# Patient Record
Sex: MALE | Race: ASIAN | NOT HISPANIC OR LATINO
[De-identification: names, ages, dates, MRNs, and addresses within clinical notes are randomized per-mention and may not be internally consistent; named-entity substitution may affect disease eponyms.]

---

## 2023-01-03 ENCOUNTER — NON-APPOINTMENT (OUTPATIENT)
Age: 63
End: 2023-01-03

## 2023-01-03 ENCOUNTER — TRANSCRIPTION ENCOUNTER (OUTPATIENT)
Age: 63
End: 2023-01-03

## 2023-01-03 ENCOUNTER — APPOINTMENT (OUTPATIENT)
Dept: OPHTHALMOLOGY | Facility: CLINIC | Age: 63
End: 2023-01-03
Payer: MEDICAID

## 2023-01-03 PROCEDURE — 92004 COMPRE OPH EXAM NEW PT 1/>: CPT

## 2023-01-03 PROCEDURE — 92015 DETERMINE REFRACTIVE STATE: CPT | Mod: NC

## 2023-01-31 ENCOUNTER — EMERGENCY (EMERGENCY)
Facility: HOSPITAL | Age: 63
LOS: 1 days | Discharge: HOME | End: 2023-01-31
Attending: EMERGENCY MEDICINE | Admitting: EMERGENCY MEDICINE
Payer: MEDICAID

## 2023-01-31 VITALS
DIASTOLIC BLOOD PRESSURE: 89 MMHG | OXYGEN SATURATION: 96 % | SYSTOLIC BLOOD PRESSURE: 182 MMHG | RESPIRATION RATE: 16 BRPM | TEMPERATURE: 98 F | HEART RATE: 105 BPM

## 2023-01-31 VITALS
SYSTOLIC BLOOD PRESSURE: 137 MMHG | TEMPERATURE: 98 F | RESPIRATION RATE: 18 BRPM | HEART RATE: 74 BPM | OXYGEN SATURATION: 99 % | DIASTOLIC BLOOD PRESSURE: 72 MMHG

## 2023-01-31 LAB
ALBUMIN SERPL ELPH-MCNC: 4.5 G/DL — SIGNIFICANT CHANGE UP (ref 3.3–5)
ALP SERPL-CCNC: 68 U/L — SIGNIFICANT CHANGE UP (ref 40–120)
ALT FLD-CCNC: 30 U/L — SIGNIFICANT CHANGE UP (ref 4–41)
ANION GAP SERPL CALC-SCNC: 16 MMOL/L — HIGH (ref 7–14)
APPEARANCE UR: CLEAR — SIGNIFICANT CHANGE UP
AST SERPL-CCNC: 21 U/L — SIGNIFICANT CHANGE UP (ref 4–40)
BACTERIA # UR AUTO: NEGATIVE — SIGNIFICANT CHANGE UP
BASE EXCESS BLDV CALC-SCNC: -0.8 MMOL/L — SIGNIFICANT CHANGE UP (ref -2–3)
BASE EXCESS BLDV CALC-SCNC: -1.1 MMOL/L — SIGNIFICANT CHANGE UP (ref -2–3)
BASOPHILS # BLD AUTO: 0.04 K/UL — SIGNIFICANT CHANGE UP (ref 0–0.2)
BASOPHILS NFR BLD AUTO: 0.4 % — SIGNIFICANT CHANGE UP (ref 0–2)
BILIRUB SERPL-MCNC: 0.2 MG/DL — SIGNIFICANT CHANGE UP (ref 0.2–1.2)
BILIRUB UR-MCNC: NEGATIVE — SIGNIFICANT CHANGE UP
BLOOD GAS VENOUS COMPREHENSIVE RESULT: SIGNIFICANT CHANGE UP
BLOOD GAS VENOUS COMPREHENSIVE RESULT: SIGNIFICANT CHANGE UP
BUN SERPL-MCNC: 19 MG/DL — SIGNIFICANT CHANGE UP (ref 7–23)
CALCIUM SERPL-MCNC: 9.8 MG/DL — SIGNIFICANT CHANGE UP (ref 8.4–10.5)
CHLORIDE BLDV-SCNC: 101 MMOL/L — SIGNIFICANT CHANGE UP (ref 96–108)
CHLORIDE BLDV-SCNC: 104 MMOL/L — SIGNIFICANT CHANGE UP (ref 96–108)
CHLORIDE SERPL-SCNC: 100 MMOL/L — SIGNIFICANT CHANGE UP (ref 98–107)
CK SERPL-CCNC: 161 U/L — SIGNIFICANT CHANGE UP (ref 30–200)
CO2 BLDV-SCNC: 24.6 MMOL/L — SIGNIFICANT CHANGE UP (ref 22–26)
CO2 BLDV-SCNC: 26.2 MMOL/L — HIGH (ref 22–26)
CO2 SERPL-SCNC: 21 MMOL/L — LOW (ref 22–31)
COLOR SPEC: COLORLESS — SIGNIFICANT CHANGE UP
CREAT SERPL-MCNC: 1.56 MG/DL — HIGH (ref 0.5–1.3)
DIFF PNL FLD: NEGATIVE — SIGNIFICANT CHANGE UP
EGFR: 49 ML/MIN/1.73M2 — LOW
EOSINOPHIL # BLD AUTO: 0.17 K/UL — SIGNIFICANT CHANGE UP (ref 0–0.5)
EOSINOPHIL NFR BLD AUTO: 1.9 % — SIGNIFICANT CHANGE UP (ref 0–6)
FLUAV AG NPH QL: SIGNIFICANT CHANGE UP
FLUBV AG NPH QL: SIGNIFICANT CHANGE UP
GAS PNL BLDV: 134 MMOL/L — LOW (ref 136–145)
GAS PNL BLDV: 135 MMOL/L — LOW (ref 136–145)
GAS PNL BLDV: SIGNIFICANT CHANGE UP
GLUCOSE BLDV-MCNC: 129 MG/DL — HIGH (ref 70–99)
GLUCOSE BLDV-MCNC: 163 MG/DL — HIGH (ref 70–99)
GLUCOSE SERPL-MCNC: 177 MG/DL — HIGH (ref 70–99)
GLUCOSE UR QL: ABNORMAL
HCO3 BLDV-SCNC: 24 MMOL/L — SIGNIFICANT CHANGE UP (ref 22–29)
HCO3 BLDV-SCNC: 25 MMOL/L — SIGNIFICANT CHANGE UP (ref 22–29)
HCT VFR BLD CALC: 43.1 % — SIGNIFICANT CHANGE UP (ref 39–50)
HCT VFR BLDA CALC: 42 % — SIGNIFICANT CHANGE UP (ref 39–51)
HCT VFR BLDA CALC: 42 % — SIGNIFICANT CHANGE UP (ref 39–51)
HGB BLD CALC-MCNC: 14 G/DL — SIGNIFICANT CHANGE UP (ref 13–17)
HGB BLD CALC-MCNC: 14.1 G/DL — SIGNIFICANT CHANGE UP (ref 13–17)
HGB BLD-MCNC: 14.1 G/DL — SIGNIFICANT CHANGE UP (ref 13–17)
IANC: 5.25 K/UL — SIGNIFICANT CHANGE UP (ref 1.8–7.4)
IMM GRANULOCYTES NFR BLD AUTO: 0.2 % — SIGNIFICANT CHANGE UP (ref 0–0.9)
KETONES UR-MCNC: NEGATIVE — SIGNIFICANT CHANGE UP
LACTATE BLDV-MCNC: 2 MMOL/L — SIGNIFICANT CHANGE UP (ref 0.5–2)
LACTATE BLDV-MCNC: 2.3 MMOL/L — HIGH (ref 0.5–2)
LEUKOCYTE ESTERASE UR-ACNC: NEGATIVE — SIGNIFICANT CHANGE UP
LYMPHOCYTES # BLD AUTO: 2.37 K/UL — SIGNIFICANT CHANGE UP (ref 1–3.3)
LYMPHOCYTES # BLD AUTO: 26.4 % — SIGNIFICANT CHANGE UP (ref 13–44)
MCHC RBC-ENTMCNC: 29.6 PG — SIGNIFICANT CHANGE UP (ref 27–34)
MCHC RBC-ENTMCNC: 32.7 GM/DL — SIGNIFICANT CHANGE UP (ref 32–36)
MCV RBC AUTO: 90.4 FL — SIGNIFICANT CHANGE UP (ref 80–100)
MONOCYTES # BLD AUTO: 1.14 K/UL — HIGH (ref 0–0.9)
MONOCYTES NFR BLD AUTO: 12.7 % — SIGNIFICANT CHANGE UP (ref 2–14)
NEUTROPHILS # BLD AUTO: 5.25 K/UL — SIGNIFICANT CHANGE UP (ref 1.8–7.4)
NEUTROPHILS NFR BLD AUTO: 58.4 % — SIGNIFICANT CHANGE UP (ref 43–77)
NITRITE UR-MCNC: NEGATIVE — SIGNIFICANT CHANGE UP
NRBC # BLD: 0 /100 WBCS — SIGNIFICANT CHANGE UP (ref 0–0)
NRBC # FLD: 0 K/UL — SIGNIFICANT CHANGE UP (ref 0–0)
PCO2 BLDV: 37 MMHG — LOW (ref 42–55)
PCO2 BLDV: 45 MMHG — SIGNIFICANT CHANGE UP (ref 42–55)
PH BLDV: 7.35 — SIGNIFICANT CHANGE UP (ref 7.32–7.43)
PH BLDV: 7.41 — SIGNIFICANT CHANGE UP (ref 7.32–7.43)
PH UR: 7.5 — SIGNIFICANT CHANGE UP (ref 5–8)
PLATELET # BLD AUTO: 275 K/UL — SIGNIFICANT CHANGE UP (ref 150–400)
PO2 BLDV: 45 MMHG — SIGNIFICANT CHANGE UP
PO2 BLDV: 61 MMHG — SIGNIFICANT CHANGE UP
POTASSIUM BLDV-SCNC: 3.2 MMOL/L — LOW (ref 3.5–5.1)
POTASSIUM BLDV-SCNC: 4.2 MMOL/L — SIGNIFICANT CHANGE UP (ref 3.5–5.1)
POTASSIUM SERPL-MCNC: 3.4 MMOL/L — LOW (ref 3.5–5.3)
POTASSIUM SERPL-SCNC: 3.4 MMOL/L — LOW (ref 3.5–5.3)
PROT SERPL-MCNC: 7.8 G/DL — SIGNIFICANT CHANGE UP (ref 6–8.3)
PROT UR-MCNC: ABNORMAL
RBC # BLD: 4.77 M/UL — SIGNIFICANT CHANGE UP (ref 4.2–5.8)
RBC # FLD: 13 % — SIGNIFICANT CHANGE UP (ref 10.3–14.5)
RBC CASTS # UR COMP ASSIST: SIGNIFICANT CHANGE UP /HPF (ref 0–4)
RSV RNA NPH QL NAA+NON-PROBE: SIGNIFICANT CHANGE UP
SAO2 % BLDV: 72.5 % — SIGNIFICANT CHANGE UP
SAO2 % BLDV: 87.1 % — SIGNIFICANT CHANGE UP
SARS-COV-2 RNA SPEC QL NAA+PROBE: SIGNIFICANT CHANGE UP
SODIUM SERPL-SCNC: 137 MMOL/L — SIGNIFICANT CHANGE UP (ref 135–145)
SP GR SPEC: 1.01 — SIGNIFICANT CHANGE UP (ref 1.01–1.05)
UROBILINOGEN FLD QL: SIGNIFICANT CHANGE UP
WBC # BLD: 8.99 K/UL — SIGNIFICANT CHANGE UP (ref 3.8–10.5)
WBC # FLD AUTO: 8.99 K/UL — SIGNIFICANT CHANGE UP (ref 3.8–10.5)
WBC UR QL: SIGNIFICANT CHANGE UP /HPF (ref 0–5)

## 2023-01-31 PROCEDURE — 70498 CT ANGIOGRAPHY NECK: CPT | Mod: 26,MA

## 2023-01-31 PROCEDURE — 70496 CT ANGIOGRAPHY HEAD: CPT | Mod: 26,MA

## 2023-01-31 PROCEDURE — 74177 CT ABD & PELVIS W/CONTRAST: CPT | Mod: 26,MA

## 2023-01-31 PROCEDURE — 99284 EMERGENCY DEPT VISIT MOD MDM: CPT

## 2023-01-31 PROCEDURE — 71046 X-RAY EXAM CHEST 2 VIEWS: CPT | Mod: 26

## 2023-01-31 RX ORDER — SODIUM CHLORIDE 9 MG/ML
1000 INJECTION INTRAMUSCULAR; INTRAVENOUS; SUBCUTANEOUS ONCE
Refills: 0 | Status: COMPLETED | OUTPATIENT
Start: 2023-01-31 | End: 2023-01-31

## 2023-01-31 RX ORDER — ACETAMINOPHEN 500 MG
650 TABLET ORAL ONCE
Refills: 0 | Status: COMPLETED | OUTPATIENT
Start: 2023-01-31 | End: 2023-01-31

## 2023-01-31 RX ORDER — ONDANSETRON 8 MG/1
4 TABLET, FILM COATED ORAL ONCE
Refills: 0 | Status: COMPLETED | OUTPATIENT
Start: 2023-01-31 | End: 2023-01-31

## 2023-01-31 RX ADMIN — SODIUM CHLORIDE 1000 MILLILITER(S): 9 INJECTION INTRAMUSCULAR; INTRAVENOUS; SUBCUTANEOUS at 01:53

## 2023-01-31 RX ADMIN — ONDANSETRON 4 MILLIGRAM(S): 8 TABLET, FILM COATED ORAL at 01:53

## 2023-01-31 RX ADMIN — Medication 650 MILLIGRAM(S): at 01:53

## 2023-01-31 NOTE — ED ADULT NURSE REASSESSMENT NOTE - NS ED NURSE REASSESS COMMENT FT1
pt A&Ox4 offering no complaints at this time. respirations even and unlabored. no acute distress noted. resting comfortably in bed at this time. awaiting CT results. safety maintained, side rails up

## 2023-01-31 NOTE — ED ADULT NURSE NOTE - OBJECTIVE STATEMENT
break coverage RN - Pt received in room 9 A&OX3 c/o body aches, pain in both legs, and increased thirst. PMHx HTN, DM, and hypercholesteremia. Pt denies fevers/chills, abd pain, CP, SOB. Resp even and unlabored. 20G IV placed to R AC. Labs drawn and sent. Will continue to monitor.

## 2023-01-31 NOTE — ED PROVIDER NOTE - NSTIMEPROVIDERCAREINITIATE_GEN_ER
1. \"Have you been to the ER, urgent care clinic since your last visit? Hospitalized since your last visit? \" No    2. \"Have you seen or consulted any other health care providers outside of the [address] [place] [address] since your last visit? \"  No    3. For patients aged 39-70: Has the patient had a colonoscopy / FIT/ Cologuard? Yes, Colonoscopy 2019 due for next 2029      If the patient is female:    4. For patients aged 41-77: Has the patient had a mammogram within the past 2 years? Yes, next 2023      5. For patients aged 21-65: Has the patient had a pap smear?   No 31-Jan-2023 01:06

## 2023-01-31 NOTE — ED PROVIDER NOTE - NS ED ROS FT
Constitutional: No fever, +chills.  Eyes:  No visual changes  ENMT:  No neck pain  Cardiac:  No chest pain  Respiratory:  No cough, SOB  GI:  +nausea, no vomiting, diarrhea, abdominal pain.  :  No dysuria, hematuria  MS:  No back pain.  Neuro:  +headache +paresthesia +body aches   Skin:  No skin rash  Except as documented in the HPI,  all other systems are negative.

## 2023-01-31 NOTE — ED PROVIDER NOTE - CARE PLAN
1 Principal Discharge DX:	Paresthesia  Secondary Diagnosis:	Body aches  Secondary Diagnosis:	Abdominal pain

## 2023-01-31 NOTE — ED ADULT NURSE NOTE - DISCHARGE DATE/TIME
Nutrition consult regarding education on diabetic diet (A1C 9.1).  Pt currently NPO, not appropriate for diabetic diet education.  RD to follow-up upon diet advancement.     Thanks! MANJINDER Jacques [phone]   31-Jan-2023 10:31

## 2023-01-31 NOTE — ED PROVIDER NOTE - ATTENDING CONTRIBUTION TO CARE
HPI: 62-year-old male with history of hypertension, diabetes, high cholesterol without any surgical history that presents with sudden onset body aches, for extremity paresthesias and generalized weakness as well as nausea without vomiting or diarrhea starting 3 hours prior to arrival.  Patient is with son-in-law at bedside.  States that he was in usual state of health and ate dinner.  Approximately 10 PM which was 3 hours prior to arrival started having symptoms as above.  8 normal dinner with everyone else without anybody else having issues.  Denies any fevers, chills, chest pain, shortness of breath, cough, runny nose, rash, travel, trauma, falls.  Patient is a lifetime non-smoker.  States that he feels like he needs to urinate at this time.  Otherwise no other complaints.    EXAM: is significant for ill-appearing male but not in acute distress.  Does not appear septic.  Oropharynx is clear but dry.  Heart is regular rate and rhythm.  Abdomen is tender to palpation in right lower quadrant with positive guarding.  No rebound.  No signs of trauma.  Abdomen is distended.  Suprapubic tenderness and fullness noted as well.  Bilateral lower extremities and upper extremities with normal pulses warm and well-perfused cap refills less than 2 seconds sensations intact to light touch.  Strength is 5 out of 5 in all 4 extremities.    MDM: 62-year-old male with history hypertension, diabetes, high cholesterol without any surgical history that presents with sudden onset body aches and paresthesias and generalized weakness.  Concern is for infection which is most likely versus appendicitis given findings of tenderness on right lower quadrant.  Considered dissection versus AAA however patient is non-smoker although he has significant hypertension on 5-6 different antihypertensives.  Patient has no abnormal neuro findings at this time.  He has normal pulses in all 4 extremities that are equal on exam.  Also consider bowel obstruction however patient has never had any surgical history and unlikely has a cancer as patient has not had any significant weight loss or night sweats.  At this time will obtain labs including urinalysis and culture, provide fluids and pain medications but also obtain CT imaging of his abdomen.  Will reassess

## 2023-01-31 NOTE — ED PROVIDER NOTE - CLINICAL SUMMARY MEDICAL DECISION MAKING FREE TEXT BOX
62-year-old male with history of hypertension, diabetes, high cholesterol without any surgical history that presents p/w body aches, paresthesias, abd pain x earlier today.  Vital stable on exam right lower quadrant tenderness to palpation,  nonperitoneal neck.  No focal neurodeficits.  moving all extremities. 62-year-old male with history of hypertension, diabetes, high cholesterol without any surgical history that presents p/w body aches, paresthesias, abd pain x earlier today. Vital stable on exam right lower quadrant tenderness to palpation,  non peritoneal neck.  No focal neurodeficits.  moving all extremities.  eval for viral syndrome, metabolic derangements, rhabdo  abd exam eval for appy, renal colic. pending labs and imaging.

## 2023-01-31 NOTE — ED PROVIDER NOTE - PATIENT PORTAL LINK FT
You can access the FollowMyHealth Patient Portal offered by [facility] by registering at the following website: [email or web address]. By joining [facility]’s FollowMyHealth portal, you will also be able to view your health information using other applications (apps) compatible with our system.

## 2023-01-31 NOTE — ED ADULT NURSE NOTE - CHIEF COMPLAINT QUOTE
Pt. is brought to [facility] ED by [facility] (unit 6M63) for body aches, pain in both legs, and being thirsty. PMH: HTN, DM, and hypercholestermia. FS at scene 199. No extremity weakness, equal arm strength, no facial droop noted at triage. . EKG at triage. NAD noted.

## 2023-01-31 NOTE — ED PROVIDER NOTE - PHYSICAL EXAMINATION
Vital signs reviewed  GENERAL: non toxic however ill, feels lethargic  HEAD: NCAT  EYES: Anicteric, perrla eomi  ENT: MMM  NECK: Supple, non tender  RESPIRATORY: Normal respiratory effort. CTA B/L. No wheezing, rales, rhonchi  CARDIOVASCULAR: Regular rate and rhythm  ABDOMEN: Soft. Nondistended. RLQ ttp, no gaurding or rebound.  MUSCULOSKELETAL/EXTREMITIES: Brisk cap refill. 2+ radial pulses. No leg edema. no calf or thigh tenderness.   MS 5/5 UE and LE. sensation intact  SKIN:  Warm and dry  PSYCHIATRIC: Cooperative. Affect appropriate.

## 2023-01-31 NOTE — ED PROVIDER NOTE - PROGRESS NOTE DETAILS
Ele Burns PGY-3 patient reassessed  able to ambulate, sxs improved however still feels lethargic 5/10, generalized HA. feels dizziness with standing, b/l leg tingling. will eval with CTA imaging of head and neck and reassess. Burt, PGY-3  Patient signed out to me. 62-year-old male with pmhx of hypertension, diabetes, high cholesterol p/w body aches, paresthesias, abd pain, pulsatile hearing earlier today; Flu/covid negative, creatinine mildly elevated; no prior to compare to. CTAP performed with no acute findings. Pending CTA head/neck ordered prior to signout for low suspicion of carotid artery dissection Burt, PGY-3  CTA shows no carotid artery dissection. Mild narrowing of left ICA. Reassessed patient who reports that he is currently asymptomatic and feeling much better; tolerating PO and ambulatory. CT results and creatinine communicated to patient who will follow up with PCP. Will d/c with return precautions Burt, PGY-3  CTA shows no carotid artery dissection. Mild narrowing of left ICA. Reassessed patient who reports that he is currently asymptomatic and feeling much better; tolerating PO and ambulatory. CT results and creatinine communicated to patient who will follow up with PCP and vascular surgery f/u. Will d/c with return precautions

## 2023-01-31 NOTE — ED PROVIDER NOTE - NSFOLLOWUPINSTRUCTIONS_ED_ALL_ED_FT
You were seen today in the emergency room for abdominal pain, numbness/tingling, and body aches. Although the testing done today indicates that your pain is not from an acute emergency, your pain could still represent a more serious problem. Most commonly, the pain you are having is likely not something serious and will resolve in a few days, however testing was done today based on the symptoms that you currently have; so if you develop new or worsening symptoms this could be a sign of a problem that was not tested for and means you should come back to the emergency room or see your doctor urgently. You need to follow up with your doctor in the next 48-72 hours. If you develop any new or worsening symptoms you need to return immediately to the emergency department. If you experience any of the following please come right back to the emergency room: severe nausea and vomiting with inability to tolerate eating, severe worsening of your pain, a new fever, new bleeding in stool or vomit, confusion, new numbness or weakness, passing out.

## 2023-01-31 NOTE — ED PROVIDER NOTE - OBJECTIVE STATEMENT
62-year-old male with history of hypertension, diabetes, high cholesterol without any surgical history that presents p/w body aches, paresthesias, abd pain x earlier today. pt was eating food afterwards began feeling sxs. generalized ill, b/l arm and leg tingling, abd pain, nausea.   pain is mostly right sided, non radiating. +headache 6/10, not thunderclap in nature  pt denies any fever, cp, palpitations, sob, v/d, hematuria, back pain, ill contacts, leg swelling

## 2023-01-31 NOTE — ED ADULT NURSE REASSESSMENT NOTE - NS ED NURSE REASSESS COMMENT FT1
Pt denies any pain at this time, but still had numbness and tingling in his lower extremities, and headache throughout his head. Pts abdomen is soft, and distended. Breath sounds are clear bilaterally. No edema noted at this time. Pt denies chest pain, SOB, fever like symptoms, body aches, abdominal pain, N/V, difficulty urinating/bowel movement. Pt denies any pain at this time, but still had numbness and tingling in his lower extremities, and headache throughout his head. Pts abdomen is soft, and distended. Breath sounds are clear bilaterally. No edema noted at this time. Pt denies chest pain, SOB, fever like symptoms, body aches, abdominal pain, N/V, difficulty urinating/bowel movement. waiting to go to CT, will continue to monitor.

## 2023-01-31 NOTE — ED ADULT TRIAGE NOTE - CHIEF COMPLAINT QUOTE
Pt. is brought to [facility] ED by [facility] (unit 6M63) for body aches, pain in both legs, and being thirsty. PMH: HTN, DM, and hypercholestermia. FS at scene 199. Pt. is brought to [facility] ED by [facility] (unit 6M63) for body aches, pain in both legs, and being thirsty. PMH: HTN, DM, and hypercholestermia. FS at scene 199. No extremity weakness, equal arm strength, no facial droop noted at triage. . EKG at triage. NAD noted.

## 2023-02-01 LAB
CULTURE RESULTS: NO GROWTH — SIGNIFICANT CHANGE UP
SPECIMEN SOURCE: SIGNIFICANT CHANGE UP

## 2023-02-28 ENCOUNTER — APPOINTMENT (OUTPATIENT)
Dept: OPHTHALMOLOGY | Facility: CLINIC | Age: 63
End: 2023-02-28

## 2024-12-17 PROBLEM — Z00.00 ENCOUNTER FOR PREVENTIVE HEALTH EXAMINATION: Status: ACTIVE | Noted: 2024-12-17

## 2025-02-26 ENCOUNTER — APPOINTMENT (OUTPATIENT)
Dept: OTOLARYNGOLOGY | Facility: CLINIC | Age: 65
End: 2025-02-26

## 2025-03-17 ENCOUNTER — EMERGENCY (EMERGENCY)
Facility: HOSPITAL | Age: 65
LOS: 1 days | Discharge: HOME | End: 2025-03-17
Attending: EMERGENCY MEDICINE | Admitting: EMERGENCY MEDICINE
Payer: MEDICARE

## 2025-03-17 VITALS
DIASTOLIC BLOOD PRESSURE: 77 MMHG | SYSTOLIC BLOOD PRESSURE: 140 MMHG | HEART RATE: 69 BPM | OXYGEN SATURATION: 99 % | WEIGHT: 187.39 LBS | RESPIRATION RATE: 16 BRPM | TEMPERATURE: 98 F

## 2025-03-17 VITALS
TEMPERATURE: 98 F | HEART RATE: 62 BPM | SYSTOLIC BLOOD PRESSURE: 126 MMHG | DIASTOLIC BLOOD PRESSURE: 73 MMHG | OXYGEN SATURATION: 100 % | RESPIRATION RATE: 16 BRPM

## 2025-03-17 LAB
ADD ON TEST-SPECIMEN IN LAB: SIGNIFICANT CHANGE UP
ALBUMIN SERPL ELPH-MCNC: 4.4 G/DL — SIGNIFICANT CHANGE UP (ref 3.3–5)
ALP SERPL-CCNC: 78 U/L — SIGNIFICANT CHANGE UP (ref 40–120)
ALT FLD-CCNC: 30 U/L — SIGNIFICANT CHANGE UP (ref 4–41)
ANION GAP SERPL CALC-SCNC: 15 MMOL/L — HIGH (ref 7–14)
APAP SERPL-MCNC: <10 UG/ML — LOW (ref 15–25)
APPEARANCE UR: CLEAR — SIGNIFICANT CHANGE UP
APTT BLD: 31.2 SEC — SIGNIFICANT CHANGE UP (ref 24.5–35.6)
AST SERPL-CCNC: 24 U/L — SIGNIFICANT CHANGE UP (ref 4–40)
BASOPHILS # BLD AUTO: 0.03 K/UL — SIGNIFICANT CHANGE UP (ref 0–0.2)
BASOPHILS NFR BLD AUTO: 0.4 % — SIGNIFICANT CHANGE UP (ref 0–2)
BILIRUB SERPL-MCNC: 0.3 MG/DL — SIGNIFICANT CHANGE UP (ref 0.2–1.2)
BILIRUB UR-MCNC: NEGATIVE — SIGNIFICANT CHANGE UP
BLOOD GAS VENOUS COMPREHENSIVE RESULT: SIGNIFICANT CHANGE UP
BLOOD GAS VENOUS COMPREHENSIVE RESULT: SIGNIFICANT CHANGE UP
BUN SERPL-MCNC: 17 MG/DL — SIGNIFICANT CHANGE UP (ref 7–23)
CALCIUM SERPL-MCNC: 9.6 MG/DL — SIGNIFICANT CHANGE UP (ref 8.4–10.5)
CHLORIDE SERPL-SCNC: 97 MMOL/L — LOW (ref 98–107)
CK MB BLD-MCNC: 1.6 % — SIGNIFICANT CHANGE UP (ref 0–2.5)
CK MB CFR SERPL CALC: 1.8 NG/ML — SIGNIFICANT CHANGE UP
CK SERPL-CCNC: 115 U/L — SIGNIFICANT CHANGE UP (ref 30–200)
CO2 SERPL-SCNC: 20 MMOL/L — LOW (ref 22–31)
COLOR SPEC: YELLOW — SIGNIFICANT CHANGE UP
CREAT SERPL-MCNC: 1.65 MG/DL — HIGH (ref 0.5–1.3)
DIFF PNL FLD: NEGATIVE — SIGNIFICANT CHANGE UP
EGFR: 46 ML/MIN/1.73M2 — LOW
EGFR: 46 ML/MIN/1.73M2 — LOW
EOSINOPHIL # BLD AUTO: 0.15 K/UL — SIGNIFICANT CHANGE UP (ref 0–0.5)
EOSINOPHIL NFR BLD AUTO: 1.9 % — SIGNIFICANT CHANGE UP (ref 0–6)
ETHANOL SERPL-MCNC: <10 MG/DL — SIGNIFICANT CHANGE UP
FLUAV AG NPH QL: SIGNIFICANT CHANGE UP
FLUBV AG NPH QL: SIGNIFICANT CHANGE UP
GLUCOSE SERPL-MCNC: 197 MG/DL — HIGH (ref 70–99)
GLUCOSE UR QL: 250 MG/DL
HCT VFR BLD CALC: 42.3 % — SIGNIFICANT CHANGE UP (ref 39–50)
HGB BLD-MCNC: 14.4 G/DL — SIGNIFICANT CHANGE UP (ref 13–17)
IANC: 4.4 K/UL — SIGNIFICANT CHANGE UP (ref 1.8–7.4)
IMM GRANULOCYTES NFR BLD AUTO: 0.8 % — SIGNIFICANT CHANGE UP (ref 0–0.9)
INR BLD: 0.94 RATIO — SIGNIFICANT CHANGE UP (ref 0.85–1.16)
KETONES UR-MCNC: NEGATIVE MG/DL — SIGNIFICANT CHANGE UP
LEUKOCYTE ESTERASE UR-ACNC: NEGATIVE — SIGNIFICANT CHANGE UP
LYMPHOCYTES # BLD AUTO: 2.61 K/UL — SIGNIFICANT CHANGE UP (ref 1–3.3)
LYMPHOCYTES # BLD AUTO: 32.7 % — SIGNIFICANT CHANGE UP (ref 13–44)
MCHC RBC-ENTMCNC: 29.6 PG — SIGNIFICANT CHANGE UP (ref 27–34)
MCHC RBC-ENTMCNC: 34 G/DL — SIGNIFICANT CHANGE UP (ref 32–36)
MCV RBC AUTO: 87 FL — SIGNIFICANT CHANGE UP (ref 80–100)
MONOCYTES # BLD AUTO: 0.74 K/UL — SIGNIFICANT CHANGE UP (ref 0–0.9)
MONOCYTES NFR BLD AUTO: 9.3 % — SIGNIFICANT CHANGE UP (ref 2–14)
NEUTROPHILS # BLD AUTO: 4.4 K/UL — SIGNIFICANT CHANGE UP (ref 1.8–7.4)
NEUTROPHILS NFR BLD AUTO: 54.9 % — SIGNIFICANT CHANGE UP (ref 43–77)
NITRITE UR-MCNC: NEGATIVE — SIGNIFICANT CHANGE UP
NRBC # BLD AUTO: 0 K/UL — SIGNIFICANT CHANGE UP (ref 0–0)
NRBC # FLD: 0 K/UL — SIGNIFICANT CHANGE UP (ref 0–0)
NRBC BLD AUTO-RTO: 0 /100 WBCS — SIGNIFICANT CHANGE UP (ref 0–0)
PCP SPEC-MCNC: SIGNIFICANT CHANGE UP
PH UR: 7.5 — SIGNIFICANT CHANGE UP (ref 5–8)
PLATELET # BLD AUTO: 278 K/UL — SIGNIFICANT CHANGE UP (ref 150–400)
POTASSIUM SERPL-MCNC: 4.8 MMOL/L — SIGNIFICANT CHANGE UP (ref 3.5–5.3)
POTASSIUM SERPL-SCNC: 4.8 MMOL/L — SIGNIFICANT CHANGE UP (ref 3.5–5.3)
PROT SERPL-MCNC: 7.9 G/DL — SIGNIFICANT CHANGE UP (ref 6–8.3)
PROT UR-MCNC: 30 MG/DL
PROTHROM AB SERPL-ACNC: 11.2 SEC — SIGNIFICANT CHANGE UP (ref 9.9–13.4)
RBC # BLD: 4.86 M/UL — SIGNIFICANT CHANGE UP (ref 4.2–5.8)
RBC # FLD: 13 % — SIGNIFICANT CHANGE UP (ref 10.3–14.5)
RSV RNA NPH QL NAA+NON-PROBE: SIGNIFICANT CHANGE UP
SALICYLATES SERPL-MCNC: 0.5 MG/DL — LOW (ref 15–30)
SARS-COV-2 RNA SPEC QL NAA+PROBE: SIGNIFICANT CHANGE UP
SODIUM SERPL-SCNC: 132 MMOL/L — LOW (ref 135–145)
SP GR SPEC: 1.03 — SIGNIFICANT CHANGE UP (ref 1–1.03)
T3 SERPL-MCNC: 114 NG/DL — SIGNIFICANT CHANGE UP (ref 80–200)
T4 AB SER-ACNC: 7.97 UG/DL — SIGNIFICANT CHANGE UP (ref 5.1–13)
T4 FREE SERPL-MCNC: 1.3 NG/DL — SIGNIFICANT CHANGE UP (ref 0.9–1.7)
TOXICOLOGY SCREEN, DRUGS OF ABUSE, SERUM RESULT: SIGNIFICANT CHANGE UP
TROPONIN T, HIGH SENSITIVITY RESULT: 8 NG/L — SIGNIFICANT CHANGE UP
TSH SERPL-MCNC: 1.78 UIU/ML — SIGNIFICANT CHANGE UP (ref 0.27–4.2)
UROBILINOGEN FLD QL: 0.2 MG/DL — SIGNIFICANT CHANGE UP (ref 0.2–1)
WBC # BLD: 7.99 K/UL — SIGNIFICANT CHANGE UP (ref 3.8–10.5)
WBC # FLD AUTO: 7.99 K/UL — SIGNIFICANT CHANGE UP (ref 3.8–10.5)

## 2025-03-17 PROCEDURE — 70496 CT ANGIOGRAPHY HEAD: CPT | Mod: 26

## 2025-03-17 PROCEDURE — 70450 CT HEAD/BRAIN W/O DYE: CPT | Mod: 26,XU

## 2025-03-17 PROCEDURE — 99291 CRITICAL CARE FIRST HOUR: CPT

## 2025-03-17 PROCEDURE — 0042T: CPT

## 2025-03-17 PROCEDURE — 71046 X-RAY EXAM CHEST 2 VIEWS: CPT | Mod: 26

## 2025-03-17 PROCEDURE — 70498 CT ANGIOGRAPHY NECK: CPT | Mod: 26

## 2025-03-17 RX ADMIN — Medication 1000 MILLILITER(S): at 18:18

## 2025-03-17 NOTE — ED PROVIDER NOTE - NSFOLLOWUPINSTRUCTIONS_ED_ALL_ED_FT
You were seen for facial droop with bilateral upper and lower extremity weakness.      Your imaging results for stroke was noted to have no acute findings.  Please follow up neurology as an outpatient for an MRI.     Continue taking your aspirin.    Given your chest pain, please follow up with cardiology.      SEEK IMMEDIATE MEDICAL CARE IF YOU HAVE ANY OF THE FOLLOWING SYMPTOMS: worsening chest pain, coughing up blood, unexplained back/neck/jaw pain, severe abdominal pain, dizziness or lightheadedness, fainting, shortness of breath, sweaty or clammy skin, vomiting, or racing heart beat, weakness, numbness, altered mental status or difficulty walking.  These symptoms may represent a serious problem that is an emergency. Do not wait to see if the symptoms will go away. Get medical help right away. Call 911 and do not drive yourself to the hospital.

## 2025-03-17 NOTE — ED ADULT NURSE NOTE - LAST KNOWN WELL DATE/TIME
Relevant Problems No relevant active problems Anesthetic History No history of anesthetic complications Review of Systems / Medical History Patient summary reviewed and pertinent labs reviewed Pulmonary Within defined limits Neuro/Psych Within defined limits Cardiovascular Hypertension: well controlled Exercise tolerance: >4 METS 
  
GI/Hepatic/Renal 
Within defined limits Endo/Other Arthritis and cancer (H/O Breast nCancer) Other Findings Physical Exam 
 
Airway Mallampati: III 
TM Distance: < 4 cm Neck ROM: normal range of motion Mouth opening: Normal 
 
 Cardiovascular Regular rate and rhythm,  S1 and S2 normal,  no murmur, click, rub, or gallop Dental 
No notable dental hx Pulmonary Breath sounds clear to auscultation Abdominal 
GI exam deferred Other Findings Anesthetic Plan ASA: 2 Anesthesia type: general and regional - sciatic single shot Induction: Intravenous Anesthetic plan and risks discussed with: Patient 17-Mar-2025 14:30

## 2025-03-17 NOTE — ED PROVIDER NOTE - CARE PROVIDER_API CALL
Libman, Richard Benjamin  Neurology  [address]  [place] [address]  Phone: [phone]  Fax: [phone]  Follow Up Time: 1-3 Days

## 2025-03-17 NOTE — CONSULT NOTE ADULT - SUBJECTIVE AND OBJECTIVE BOX
Neurology - Consult Note    -  Spectra: [id number] ([facility]), [phone] ([facility])  -    HPI: 64RHM with PMHx of HTN, T2DM, HLD, headaches BIBEMS to [facility] ED as code stroke due to numbness and weakness in all 4 extremities, bilateral facial droop, with R>L, and drift of right upper extremity. Patient reported that he found out earlier on 3/17/2025 that his friend passed away, and symptoms began soon after. LKW at around 13:30 on 3/17/2025. Patient's daughter at bedside reports that patient stated he was having chest pain and palpitations, and that his entire body was shaking. Family checked patient's blood pressure, which was 190/120, and administered his home clonidine. After discussion with PCP, two aspirin 81 mg were administered, followed by two more aspirin 81 mg when symptoms did not resolve. Daughter states that patient seems very nervous currently as his brothers passed away from strokes at a young age. Patient also reports gradual onset, constant headache that he now rates as 6-7/10. Patient has had previous episodes in which his BP was elevated, was evaluated in ED, and determined to be hypertensive urgency with CTH nonacute. Takes aspirin at home, handles ADLs and iADLs independently.       Review of Systems:    CONSTITUTIONAL: No fevers or chills  EYES AND ENT: No visual changes or no throat pain   NECK: No pain or stiffness  RESPIRATORY: No hemoptysis or shortness of breath  CARDIOVASCULAR: No chest pain or palpitations  GASTROINTESTINAL: No melena or hematochezia  GENITOURINARY: No dysuria or hematuria  NEUROLOGICAL: +As stated in HPI above  SKIN: No itching or burning  All other review of systems is negative unless indicated above.    Allergies:  No Known Allergies      PMHx/PSHx/Family Hx: As above, otherwise see below   HTN (hypertension)    HLD (hyperlipidemia)    Diabetes type 2        Social Hx:  No current use of tobacco, alcohol, or illicit drugs  Lives with family    Medications:  MEDICATIONS  (STANDING):    MEDICATIONS  (PRN):      Vitals:  T(C): 36.7 (03-17-25 @ 16:30), Max: 36.7 (03-17-25 @ 16:20)  HR: 68 (03-17-25 @ 17:00) (66 - 70)  BP: 131/70 (03-17-25 @ 17:00) (126/74 - 140/77)  RR: 17 (03-17-25 @ 17:00) (16 - 17)  SpO2: 99% (03-17-25 @ 17:00) (99% - 100%)    Physical Examination: INCOMPLETE  General - NAD  Cardiovascular - Peripheral pulses palpable, no edema  Eyes - Fundoscopy with flat, sharp optic discs and no hemorrhage or exudates; Fundoscopy not well visualized; Fundoscopy not performed due to safety precautions in the setting of the COVID-19 pandemic    Neurologic Exam:  Mental status - Eyes open, attending to examiner. Awake, Alert, Oriented to person, place, and time. Speech fluent, repetition and naming intact. Follows simple and complex commands. Attention/concentration, recent and remote memory (including registration and recall), and fund of knowledge intact    Cranial nerves - PERRLA, VFF, EOMI, face sensation (V1-V3) intact b/l, facial strength intact without asymmetry b/l, hearing intact b/l, palate with symmetric elevation, trapezius 5/5 strength b/l, tongue midline on protrusion with full lateral movement    Motor - Normal bulk and tone throughout. No pronator drift.  Strength testing            Deltoid      Biceps      Triceps     Wrist Extension    Wrist Flexion     Interossei         R            5                 5               5                     5                            5                        5                 5  L             5                 5               5                     5                            5                        5                 5              Hip Flexion    Hip Extension    Knee Flexion    Knee Extension    Dorsiflexion    Plantar Flexion  R              5                           5                       5                           5                            5                          5  L              5                           5                        5                           5                            5                          5    Sensation - Light touch intact throughout    DTR's -             Biceps      Triceps     Brachioradialis      Patellar    Ankle    Toes/plantar response  R             2+             2+                  2+                       2+            2+                 Down  L              2+             2+                 2+                        2+           2+                 Down    Coordination - Finger to Nose intact b/l. No tremors appreciated    Gait and station - Normal casual gait. Romberg (-)    Labs:                        14.4   7.99  )-----------( 278      ( 17 Mar 2025 16:15 )             42.3     03-17    132[L]  |  97[L]  |  17  ----------------------------<  197[H]  4.8   |  20[L]  |  1.65[H]    Ca    9.6      17 Mar 2025 16:15    TPro  7.9  /  Alb  4.4  /  TBili  0.3  /  DBili  x   /  AST  24  /  ALT  30  /  AlkPhos  78  03-17    CAPILLARY BLOOD GLUCOSE        LIVER FUNCTIONS - ( 17 Mar 2025 16:15 )  Alb: 4.4 g/dL / Pro: 7.9 g/dL / ALK PHOS: 78 U/L / ALT: 30 U/L / AST: 24 U/L / GGT: x             PT/INR - ( 17 Mar 2025 16:15 )   PT: 11.2 sec;   INR: 0.94 ratio         PTT - ( 17 Mar 2025 16:15 )  PTT:31.2 sec  CSF:                  Radiology:     Neurology - Consult Note    -  Spectra: [id number] ([facility]), [phone] ([facility])  -    HPI: 64RHM with PMHx of HTN, T2DM, HLD, headaches BIBEMS to [facility] ED as code stroke due to numbness and weakness in all 4 extremities, bilateral facial droop, with R>L, and drift of right upper extremity. Patient reported that he found out earlier on 3/17/2025 that his friend passed away, and symptoms began soon after. LKW at around 13:30 on 3/17/2025. Patient's daughter at bedside reports that patient stated he was having chest pain and palpitations, and that his entire body was shaking. Family checked patient's blood pressure, which was 190/120, and administered his home clonidine. After discussion with PCP, two aspirin 81 mg were administered, followed by two more aspirin 81 mg when symptoms did not resolve. Daughter states that patient seems very nervous currently as his brothers passed away from strokes at a young age. Patient also reports gradual onset, constant headache that he now rates as 6-7/10. Patient has had previous episodes in which his BP was elevated, was evaluated in ED, and determined to be hypertensive urgency with CTH nonacute. Takes aspirin at home, handles ADLs and iADLs independently.       Review of Systems:    CONSTITUTIONAL: No fevers or chills  EYES AND ENT: No visual changes or no throat pain   NECK: No pain or stiffness  RESPIRATORY: No hemoptysis or shortness of breath  CARDIOVASCULAR: No chest pain or palpitations  GASTROINTESTINAL: No melena or hematochezia  GENITOURINARY: No dysuria or hematuria  NEUROLOGICAL: +As stated in HPI above  SKIN: No itching or burning  All other review of systems is negative unless indicated above.    Allergies:  No Known Allergies      PMHx/PSHx/Family Hx: As above, otherwise see below   HTN (hypertension)    HLD (hyperlipidemia)    Diabetes type 2        Social Hx:  No current use of tobacco, alcohol, or illicit drugs  Lives with family    Medications:  MEDICATIONS  (STANDING):    MEDICATIONS  (PRN):      Vitals:  T(C): 36.7 (03-17-25 @ 16:30), Max: 36.7 (03-17-25 @ 16:20)  HR: 68 (03-17-25 @ 17:00) (66 - 70)  BP: 131/70 (03-17-25 @ 17:00) (126/74 - 140/77)  RR: 17 (03-17-25 @ 17:00) (16 - 17)  SpO2: 99% (03-17-25 @ 17:00) (99% - 100%)    Physical Examination:  General - NAD, anxious appearing  Cardiovascular - no edema    Neurologic Exam:  Mental status - Eyes closed, opens to voice, attending to examiner then closes eyes again. Oriented to person, place, and time. Speech fluent, repetition intact. Follows simple commands.     Cranial nerves - PERRL, VFF, EOMI, decreased sensation to cold temperature in left V1, V2-V3 intact b/l to cold temperature, facial strength intact without asymmetry b/l, hearing intact b/l, palate with symmetric elevation, tongue midline on protrusion with full lateral movement    Motor - Normal bulk and tone throughout. No pronator drift.  Strength testing  *giveway weakness noted            Deltoid      Biceps      Triceps              R            *4-              4+            4+                  5  L            *4-              4+             4+                 5              Hip Flexion    Hip Extension    Knee Flexion    Knee Extension    Dorsiflexion    Plantar Flexion  R            3                         3                   4-                 4-                         4-                         4-  L            3                          3                   4-               4-                           4-                        4-    Sensation - Decreased sensation to cold temperature in right arm and right leg    Coordination - Finger to Nose intact b/l. No tremors appreciated.     Labs:                        14.4   7.99  )-----------( 278      ( 17 Mar 2025 16:15 )             42.3     03-17    132[L]  |  97[L]  |  17  ----------------------------<  197[H]  4.8   |  20[L]  |  1.65[H]    Ca    9.6      17 Mar 2025 16:15    TPro  7.9  /  Alb  4.4  /  TBili  0.3  /  DBili  x   /  AST  24  /  ALT  30  /  AlkPhos  78  03-17    CAPILLARY BLOOD GLUCOSE        LIVER FUNCTIONS - ( 17 Mar 2025 16:15 )  Alb: 4.4 g/dL / Pro: 7.9 g/dL / ALK PHOS: 78 U/L / ALT: 30 U/L / AST: 24 U/L / GGT: x             PT/INR - ( 17 Mar 2025 16:15 )   PT: 11.2 sec;   INR: 0.94 ratio         PTT - ( 17 Mar 2025 16:15 )  PTT:31.2 sec  CSF:                  Radiology:  CT Brain Perfusion Maps Stroke (03.17.25 @ 16:22) >  CT HEAD:  1. No significant change.  2. No evidence of acute hemorrhage, territorial infarct or mass effect.   Additional findings, as above.  3. If clinical symptoms persist recommend further imaging with MRI,   provided there are no medical contraindications.    CT PERFUSION:  1. No predicted core infarct.  2. No evidence of active ischemia-tissue at risk.    CTA NECK:  1. Bilateral vertebral arteries patent.  2. No significant stenosis, occlusion or dissection bilateral   extracranial carotid arteries.  3. 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 Diagnostic considerations include, but are not   limited to, lymphadenopathy, nerve sheath tumor as well as exophytic   esophageal neoplasm. Recommend initial further evaluation with dedicated   CT chest to exclude additional thoracic pathology.    CTA HEAD:  1. No proximal large vessel occlusion.  2. Additional findings described in detail above.

## 2025-03-17 NOTE — ED PROVIDER NOTE - PROGRESS NOTE DETAILS
Gong: Patient reports feeling better.  Repeat labs improved with Lactate Trend: 1.8 (03-17-25 @ 20:20)  3.0 (03-17-25 @ 16:52), and trops stable.  Offered CDU for prolonged observation and imaging, however, patient prefers to go home and has follow up with cardiology.  Patient to follow up with neurology for outpatient MRI

## 2025-03-17 NOTE — ED PROVIDER NOTE - CLINICAL SUMMARY MEDICAL DECISION MAKING FREE TEXT BOX
[age] yo M a/w weakness, facial droop, and bilateral upper and lower ext weakness with associated chest pain and shortness of breath.  Pt noted to have symptoms start at the time of receiving bad news.  Pt noted to have non-focal weakness, code stroke called in triage.  NIHSS 11, however noted to have non-focal findings suggestive of acute occlusion.  Given non-focal exam, TNK was not given.  Will follow up CT imaging, check cbc, cmp, tsh, trop, ekg.  Reassess.  Will reassess.

## 2025-03-17 NOTE — CONSULT NOTE ADULT - ASSESSMENT
LKW: 13:30 on 3/17/2025  NIHSS: 11  Baseline MRS: 0  Not a tenecteplase candidate due to nondisabling deficits.   Not a thrombectomy candidate due to no LVO.     Impression:     Recommendations:  - If patient feels better, can obtain MRI brain without contrast outpatient  - If patient is to be admitted or in CDU, can obtain MRI brain w/o   - Continue home aspirin upon discharge    Discussed with stroke fellow       and under supervision of attending       regarding decision against candidacy for tenecteplase/ thrombectomy. Will be formally staffed on morning rounds with attending. Recommendations will be complete once signed by attending. Assessment: 64RHM with PMHx of HTN, T2DM, HLD, headaches BIBEMS to [facility] ED as code stroke due to numbness and weakness in all 4 extremities, bilateral facial droop, with R>L, and drift of right upper extremity. Symptoms began soon after patient found out that his friend passed away. Neurological exam with no focal neurological deficits. CTH nonacute, CTA H/N with no LVO, CTP with no deficits.     LKW: 13:30 on 3/17/2025  NIHSS: 11  Baseline MRS: 0  Not a tenecteplase candidate due to nondisabling deficits.   Not a thrombectomy candidate due to no LVO.     Impression: Sensorimotor deficits with no clear localization. Etiology more likely functional neurological disorder in setting of recent stressor, less likely acute ischemic stroke.     Recommendations:  - If patient reports improvement in symptoms back to baseline, can obtain MRI brain without contrast outpatient  - If patient is to be admitted or in CDU, then can obtain MRI brain w/o   - Continue home aspirin tomorrow  - General neurochecks q4h  - BP goals normotension 140/90      Discussed with stroke fellow Dr. Sanderson under supervision of attending Dr. Libman regarding decision against candidacy for tenecteplase/ thrombectomy.

## 2025-03-17 NOTE — ED PROVIDER NOTE - PHYSICAL EXAMINATION
GEN - NAD; well appearing; A+O x3; non-toxic appearing   HEAD: NCAT; EYES: PERRLA, EOMI, no discharge; No FACIAL DROOP NOTED, NOSE: No turbinate swelling, no bleeding noted; NECK: Supple; no lymphadenopathy. THROAT: Oral cavity and pharynx normal. No inflammation, swelling, exudate, or lesions.   CARD -s1s2, RRR, no M,G,R;   PULM - CTA b/l, symmetric breath sounds;   ABD -  +BS, ND, NT, soft, no guarding, no rebound, no masses;   BACK - no CVA tenderness, Normal  spine;   EXT - symmetric pulses, 2+ dp, capillary refill < 2 seconds, no cyanosis, no edema;   NEURO: alert, CN 2-12 intact, no facial droop noted, reflexes nl, sensation intact bilaterally, coordination nl, finger to nose nl, romberg negative, motor 4+/5 Bilateral UE; 4/5 bilateral LE, bilateral pronator drift, gait not assessed

## 2025-03-17 NOTE — ED PROVIDER NOTE - CRITICAL CARE ATTENDING CONTRIBUTION TO CARE
Patient was critically ill with a high probability of imminent or life threatening deterioration.    I have performed direct patient care (not related to procedure), additional history taking, interpretation of diagnostic studies, documentation, consultation with other physicians, telephone consultation with the patient's family    I have personally and independently provided the amount of critical care time documented below excluding time spent on separate procedures: 35 mins.

## 2025-03-17 NOTE — ED ADULT NURSE NOTE - OBJECTIVE STATEMENT
Shaneka RN: Received pt to ambay as prenotification for stroke. Pt taken directly to CT scan. Pt is a 65 y/o Male, A&Ox4, ambulatory at baseline with a PHx of HTN. Pt presents with c/o headache, right and left facial droop, with right side more pronounced. Pt also endorsing generalized weakness. Pt slow to respond but able to follow commands at this time. Respirations even and unlabored, chest rise equal b/l. VS as noted in flow sheets. Pt denies chest pain, SOB, fever, cough, chills, abdominal pain, N/V/D, dizziness, numbness/tingling or any urinary symptoms at this time. Neuro team and ED team at bedside. No acute distress noted. Safety maintained throughout. Report given to primary RN and shaneka RN.

## 2025-03-17 NOTE — ED PROVIDER NOTE - OBJECTIVE STATEMENT
63 yo M with HTN, DM, HLD, without any surgical history a/w facial droop and lower extremity weakness started 1.5 hours prior to arrival.  Code stroke was called from triage for facial droop with RUE drift.  FS noted to be 185.  As per wife, patient was noted to have receive news that a close friend  in [facility] today at the onset of symptoms, noted to have diffuse tingling and upper and lower extremity weakness bilaterally.  Pt also reported to have chest pain and shortness of breath, in addition to headache.    No fever/chills, No photophobia/eye pain/changes in vision, No ear pain/sore throat/dysphagia, No palpitations, no cough/wheeze/stridor, No abd pain, No N/V/D, no dysuria/frequency/discharge, No neck/back pain, no rash.

## 2025-03-17 NOTE — ED ADULT TRIAGE NOTE - CHIEF COMPLAINT QUOTE
Pt pre-notification for stroke. Complaining of right and left facial droop, right side more pronounced. right sided upper extremity drift and hypertension. Pt brought directly to CT, finger stick 285 Pt pre-notification for stroke. Complaining of right and left facial droop, right side more pronounced. right sided upper extremity drift and hypertension. Pt brought directly to CT, finger stick 185

## 2025-03-17 NOTE — ED ADULT NURSE NOTE - WEIGHT IN LBS
[Dear  ___] : Dear  [unfilled], [Courtesy Letter:] : I had the pleasure of seeing your patient, [unfilled], in my office today. [Please see my note below.] : Please see my note below. [Consult Closing:] : Thank you very much for allowing me to participate in the care of this patient.  If you have any questions, please do not hesitate to contact me. [phone] [Sincerely,] : Sincerely, [FreeTextEntry3] : Angelina Haley MD\par Pediatric Endocrinology\par [facility]\par [facility]\par

## 2025-03-17 NOTE — ED ADULT NURSE NOTE - CHIEF COMPLAINT QUOTE
Pt pre-notification for stroke. Complaining of right and left facial droop, right side more pronounced. right sided upper extremity drift and hypertension. Pt brought directly to CT, finger stick 285

## 2025-03-17 NOTE — ED PROVIDER NOTE - NSFOLLOWUPCLINICS_GEN_ALL_ED_FT
Cardiology at [facility]  Cardiology  [address] [place] [id number]  Phone: [phone]  Fax:   Follow Up Time: 1-3 Days    [facility]  Neurology  [address] [place] [id number]  Phone: [phone]  Fax:

## 2025-03-17 NOTE — ED ADULT NURSE REASSESSMENT NOTE - NS ED NURSE REASSESS COMMENT FT1
Ambulation trial performed with pt - ambulates with steady gait; denies weakness, dizziness.  A&OX4, respirations even and unlabored. Denies chest pain, SOB, headache, palpitations, blurry vision. Vitals as documented, no acute distress noted. Awaiting discharge.
Pt awake and alert, A&OX4, respirations even and unlabored. Denies CP, SOB, HA, dizziness, palpitations, blurry vision. Resting comfortably. Remains on cardiac monitor, NSR. NO acute distress noted. Daughter remains at bedside. Safety & comfort maintained. Awaiting dispo.
received report from Float rn. pt A&Ox4, vitally stable. noted to have bilateral facial droop R > L. 5/5 upper and lower extremity strength, no drift noted. PERRLA. pending neuro recs. urine samples collected and sent. pendign orders. safeyt maintained

## 2025-03-17 NOTE — ED PROVIDER NOTE - PATIENT PORTAL LINK FT
You can access the FollowMyHealth Patient Portal offered by [facility] by registering at the following website: [email or web address]. By joining [employer]’s FollowMyHealth portal, you will also be able to view your health information using other applications (apps) compatible with our system.

## 2025-08-06 ENCOUNTER — EMERGENCY (EMERGENCY)
Facility: HOSPITAL | Age: 65
LOS: 1 days | End: 2025-08-06
Attending: EMERGENCY MEDICINE | Admitting: EMERGENCY MEDICINE
Payer: MEDICARE

## 2025-08-06 VITALS
SYSTOLIC BLOOD PRESSURE: 153 MMHG | TEMPERATURE: 98 F | DIASTOLIC BLOOD PRESSURE: 83 MMHG | OXYGEN SATURATION: 100 % | HEART RATE: 88 BPM | HEIGHT: 68 IN | RESPIRATION RATE: 16 BRPM | WEIGHT: 186.07 LBS

## 2025-08-06 PROBLEM — E11.9 TYPE 2 DIABETES MELLITUS WITHOUT COMPLICATIONS: Chronic | Status: ACTIVE | Noted: 2025-03-17

## 2025-08-06 PROBLEM — I10 ESSENTIAL (PRIMARY) HYPERTENSION: Chronic | Status: ACTIVE | Noted: 2025-03-17

## 2025-08-06 PROBLEM — E78.5 HYPERLIPIDEMIA, UNSPECIFIED: Chronic | Status: ACTIVE | Noted: 2025-03-17

## 2025-08-06 PROCEDURE — 99284 EMERGENCY DEPT VISIT MOD MDM: CPT

## 2025-08-06 RX ORDER — DIPHENHYDRAMINE HCL 12.5MG/5ML
50 ELIXIR ORAL ONCE
Refills: 0 | Status: COMPLETED | OUTPATIENT
Start: 2025-08-06 | End: 2025-08-06

## 2025-08-06 RX ORDER — IBUPROFEN 200 MG
600 TABLET ORAL ONCE
Refills: 0 | Status: COMPLETED | OUTPATIENT
Start: 2025-08-06 | End: 2025-08-06

## 2025-08-06 RX ORDER — DEXAMETHASONE 0.5 MG/1
10 TABLET ORAL ONCE
Refills: 0 | Status: DISCONTINUED | OUTPATIENT
Start: 2025-08-06 | End: 2025-08-06

## 2025-08-06 RX ORDER — ACETAMINOPHEN 500 MG/5ML
975 LIQUID (ML) ORAL ONCE
Refills: 0 | Status: COMPLETED | OUTPATIENT
Start: 2025-08-06 | End: 2025-08-06

## 2025-08-06 RX ORDER — METOCLOPRAMIDE HCL 10 MG
10 TABLET ORAL ONCE
Refills: 0 | Status: DISCONTINUED | OUTPATIENT
Start: 2025-08-06 | End: 2025-08-06

## 2025-08-06 RX ORDER — PREDNISONE 20 MG/1
50 TABLET ORAL ONCE
Refills: 0 | Status: DISCONTINUED | OUTPATIENT
Start: 2025-08-06 | End: 2025-08-06

## 2025-08-06 RX ORDER — ONDANSETRON HCL/PF 4 MG/2 ML
4 VIAL (ML) INJECTION ONCE
Refills: 0 | Status: DISCONTINUED | OUTPATIENT
Start: 2025-08-06 | End: 2025-08-06

## 2025-08-06 RX ORDER — ACETAMINOPHEN 500 MG/5ML
1000 LIQUID (ML) ORAL ONCE
Refills: 0 | Status: DISCONTINUED | OUTPATIENT
Start: 2025-08-06 | End: 2025-08-06

## 2025-08-06 RX ORDER — DIPHENHYDRAMINE HCL 12.5MG/5ML
50 ELIXIR ORAL ONCE
Refills: 0 | Status: DISCONTINUED | OUTPATIENT
Start: 2025-08-06 | End: 2025-08-06

## 2025-08-06 RX ADMIN — Medication 20 MILLIGRAM(S): at 21:31

## 2025-08-06 RX ADMIN — Medication 975 MILLIGRAM(S): at 21:32

## 2025-08-06 RX ADMIN — Medication 600 MILLIGRAM(S): at 23:23

## 2025-08-06 RX ADMIN — Medication 50 MILLIGRAM(S): at 21:31

## 2025-08-07 VITALS
DIASTOLIC BLOOD PRESSURE: 75 MMHG | TEMPERATURE: 98 F | OXYGEN SATURATION: 96 % | RESPIRATION RATE: 16 BRPM | HEART RATE: 73 BPM | SYSTOLIC BLOOD PRESSURE: 147 MMHG